# Patient Record
(demographics unavailable — no encounter records)

---

## 2025-01-15 NOTE — DATA REVIEWED
[de-identified] : My interpretation and review of images taken today, 01/14/2025, in office:  1. AP/Lat scoliosis obtained and reviewed today with patient. There is a mild, stable curvature noted on AP films.  Lateral spine x-rays reveal kyphosis of about 64 degrees, relatively unchanged since last imaging. Consistent with Scheuermann's kyphosis with anterior wedging of 3 consecutive vertebrae. Risser V.  My interpretation and review of images taken 08/02/2023, in office: 1. AP/Lat scoliosis obtained and reviewed today with family. There is a mild, stable curvature noted on AP films.  Lateral spine x-rays reveal kyphosis of about 64 degrees, relatively unchanged since last imaging. Consistent with Scheuermann's kyphosis with anterior wedging of 3 consecutive vertebrae. Risser 5.  2. AP/Frog pelvis XR without osseous abnormalities  3. Bilateral shoulders XR show no osseous abnormalities

## 2025-01-15 NOTE — ASSESSMENT
[FreeTextEntry1] : 21 year old male with h/o ependymoma with resection (2015, Dr. Franklin, Neurosurgery), Schuermann's kyphosis, intermittent trapezius/upper back pain.   We discussed his exam and imaging today with patient. His radiographs show his kyphosis appears stable. He will continue with PT for core exercises and posture training. We discussed trying Sch[facility]th therapy vs seeking a more sports therapy oriented PT vs  to help with his goals as well. Rx was provided today.  I would like to see him back in 6 months if still having concerns for repeat evaluation, where we will get AP/Lateral scoliosis XR.   This plan was discussed with family and all questions and concerns were addressed today.  I, Samina Rodríguez PA-C, have acted as a scribe and documented the above for Dr. Partida  The above documentation completed by the scribe is an accurate record of both my words and actions.

## 2025-01-15 NOTE — PHYSICAL EXAM
[FreeTextEntry1] : Healthy appearing 21 year-old child. Awake, alert, in no acute distress. Pleasant and cooperative.  Eyes are clear with no sclera abnormalities. External ears, nose and mouth are clear.  Good respiratory effort with no audible wheezing without use of a stethoscope. Ambulates independently with no evidence of antalgia. Good coordination and balance. Able to get on and off exam table without difficulty.   Musculoskeletal:  Examination of the back reveals relatively level shoulders and pelvis. Full cervical spine ROM without pain.On forward bending minimal rotational abnormality noted. Patient is able to bend forward and touch the toes as well bend backwards without pain. Lateral flexion is symmetrical and is pain free. Straight leg raising test is free to more than 70 degrees. Mild kyphosis somewhat correctable with hyperextension, though is not readily obvious with shirt on.  Neurological examination reveals a grade 5/5 muscle power. Sensation is intact to crude touch and pinprick. Deep tendon reflexes are 1+ with ankle jerk and knee jerk. The plantars are bilaterally down going. Superficial abdominal reflexes are symmetric and intact. The biceps and triceps reflexes are 1+.  There is no hairy patch, lipoma, sinus in the back. There is no pes cavus, asymmetry of calves, significant leg length discrepancy or significant cafe-au-lait spots.  Able to walk on tiptoes as well as heels without difficulty or pain. Child is able to jump and squat without difficulty.

## 2025-01-15 NOTE — HISTORY OF PRESENT ILLNESS
[FreeTextEntry1] : 21-year-old male with h/o ependymoma with resection (2015, Dr. Franklin, Neurosurgery) presents today for follow up of kyphosis, shoulder pain R>L.  He is being followed by me for Scheuermann's kyphosis and is being treated with physical therapy, back and core strengthening. He has been working very hard on increasing his core and doing the exercises at home. He gets some occassional discomforts to the right trapezius and rhomboid area in his back.  He denies extremity numbness, tingling, bowel or bladder dysfunction. He will be going to [place] in February with school program and wanted to return for check up before leaving. He is wondering if there is a more strict or regimented program he can do to work on his back to improve his posture as well as decrease his symptoms of pain. He is here today to follow-up regarding his kyphosis, intermittent upper back pain.

## 2025-01-15 NOTE — HISTORY OF PRESENT ILLNESS
[FreeTextEntry1] : 21-year-old male with h/o ependymoma with resection (2015, Dr. Franklin, Neurosurgery) presents today for follow up of kyphosis, shoulder pain R>L.  He is being followed by me for Scheuermann's kyphosis and is being treated with physical therapy, back and core strengthening. He has been working very hard on increasing his core and doing the exercises at home. He gets some occassional discomforts to the right trapezius and rhomboid area in his back.  He denies extremity numbness, tingling, bowel or bladder dysfunction. He will be going to [place] in February with school program and wanted to return for check up before leaving. He is wondering if there is a more strict or regimented program he can do to work on his back to improve his posture as well as decrease his symptoms of pain. He is here today to follow-up regarding his kyphosis, intermittent upper back pain.

## 2025-01-15 NOTE — DATA REVIEWED
[de-identified] : My interpretation and review of images taken today, 01/14/2025, in office:  1. AP/Lat scoliosis obtained and reviewed today with patient. There is a mild, stable curvature noted on AP films.  Lateral spine x-rays reveal kyphosis of about 64 degrees, relatively unchanged since last imaging. Consistent with Scheuermann's kyphosis with anterior wedging of 3 consecutive vertebrae. Risser V.  My interpretation and review of images taken 08/02/2023, in office: 1. AP/Lat scoliosis obtained and reviewed today with family. There is a mild, stable curvature noted on AP films.  Lateral spine x-rays reveal kyphosis of about 64 degrees, relatively unchanged since last imaging. Consistent with Scheuermann's kyphosis with anterior wedging of 3 consecutive vertebrae. Risser 5.  2. AP/Frog pelvis XR without osseous abnormalities  3. Bilateral shoulders XR show no osseous abnormalities